# Patient Record
Sex: MALE | Race: WHITE | URBAN - METROPOLITAN AREA
[De-identification: names, ages, dates, MRNs, and addresses within clinical notes are randomized per-mention and may not be internally consistent; named-entity substitution may affect disease eponyms.]

---

## 2021-08-29 ENCOUNTER — APPOINTMENT (OUTPATIENT)
Dept: GENERAL RADIOLOGY | Age: 66
DRG: 177 | End: 2021-08-29
Payer: MEDICARE

## 2021-08-29 ENCOUNTER — HOSPITAL ENCOUNTER (INPATIENT)
Age: 66
LOS: 3 days | Discharge: LEFT BEFORE DISCHARGE | DRG: 177 | End: 2021-09-01
Attending: EMERGENCY MEDICINE | Admitting: INTERNAL MEDICINE
Payer: MEDICARE

## 2021-08-29 DIAGNOSIS — R09.02 HYPOXIA: Primary | ICD-10-CM

## 2021-08-29 DIAGNOSIS — J12.9 VIRAL PNEUMONIA: ICD-10-CM

## 2021-08-29 PROBLEM — J12.82 PNEUMONIA DUE TO 2019 NOVEL CORONAVIRUS: Status: ACTIVE | Noted: 2021-08-29

## 2021-08-29 PROBLEM — U07.1 PNEUMONIA DUE TO 2019 NOVEL CORONAVIRUS: Status: ACTIVE | Noted: 2021-08-29

## 2021-08-29 LAB
ALBUMIN SERPL-MCNC: 3.8 G/DL (ref 3.4–5)
ALP BLD-CCNC: 61 U/L (ref 40–129)
ALT SERPL-CCNC: 23 U/L (ref 10–40)
ANION GAP SERPL CALCULATED.3IONS-SCNC: 12 MMOL/L (ref 3–16)
AST SERPL-CCNC: 45 U/L (ref 15–37)
BASOPHILS ABSOLUTE: 0 K/UL (ref 0–0.2)
BASOPHILS RELATIVE PERCENT: 0.4 %
BILIRUB SERPL-MCNC: 0.7 MG/DL (ref 0–1)
BILIRUBIN DIRECT: 0.3 MG/DL (ref 0–0.3)
BILIRUBIN, INDIRECT: 0.4 MG/DL (ref 0–1)
BUN BLDV-MCNC: 16 MG/DL (ref 7–20)
CALCIUM SERPL-MCNC: 8.6 MG/DL (ref 8.3–10.6)
CHLORIDE BLD-SCNC: 100 MMOL/L (ref 99–110)
CO2: 25 MMOL/L (ref 21–32)
CREAT SERPL-MCNC: 1.5 MG/DL (ref 0.8–1.3)
EOSINOPHILS ABSOLUTE: 0 K/UL (ref 0–0.6)
EOSINOPHILS RELATIVE PERCENT: 0 %
GFR AFRICAN AMERICAN: 57
GFR NON-AFRICAN AMERICAN: 47
GLUCOSE BLD-MCNC: 117 MG/DL (ref 70–99)
HCT VFR BLD CALC: 40.6 % (ref 40.5–52.5)
HEMOGLOBIN: 14.3 G/DL (ref 13.5–17.5)
LIPASE: 62 U/L (ref 13–60)
LYMPHOCYTES ABSOLUTE: 1.1 K/UL (ref 1–5.1)
LYMPHOCYTES RELATIVE PERCENT: 29.5 %
MCH RBC QN AUTO: 31.3 PG (ref 26–34)
MCHC RBC AUTO-ENTMCNC: 35.3 G/DL (ref 31–36)
MCV RBC AUTO: 88.7 FL (ref 80–100)
MONOCYTES ABSOLUTE: 0.3 K/UL (ref 0–1.3)
MONOCYTES RELATIVE PERCENT: 8.9 %
NEUTROPHILS ABSOLUTE: 2.4 K/UL (ref 1.7–7.7)
NEUTROPHILS RELATIVE PERCENT: 61.2 %
PDW BLD-RTO: 13.5 % (ref 12.4–15.4)
PLATELET # BLD: 100 K/UL (ref 135–450)
PMV BLD AUTO: 10 FL (ref 5–10.5)
POTASSIUM REFLEX MAGNESIUM: 4.2 MMOL/L (ref 3.5–5.1)
PROCALCITONIN: 0.1 NG/ML (ref 0–0.15)
RBC # BLD: 4.58 M/UL (ref 4.2–5.9)
SARS-COV-2, NAAT: DETECTED
SODIUM BLD-SCNC: 137 MMOL/L (ref 136–145)
TOTAL PROTEIN: 7.4 G/DL (ref 6.4–8.2)
WBC # BLD: 3.9 K/UL (ref 4–11)

## 2021-08-29 PROCEDURE — U0005 INFEC AGEN DETEC AMPLI PROBE: HCPCS

## 2021-08-29 PROCEDURE — 99285 EMERGENCY DEPT VISIT HI MDM: CPT

## 2021-08-29 PROCEDURE — 80048 BASIC METABOLIC PNL TOTAL CA: CPT

## 2021-08-29 PROCEDURE — 87635 SARS-COV-2 COVID-19 AMP PRB: CPT

## 2021-08-29 PROCEDURE — 84145 PROCALCITONIN (PCT): CPT

## 2021-08-29 PROCEDURE — 85025 COMPLETE CBC W/AUTO DIFF WBC: CPT

## 2021-08-29 PROCEDURE — U0003 INFECTIOUS AGENT DETECTION BY NUCLEIC ACID (DNA OR RNA); SEVERE ACUTE RESPIRATORY SYNDROME CORONAVIRUS 2 (SARS-COV-2) (CORONAVIRUS DISEASE [COVID-19]), AMPLIFIED PROBE TECHNIQUE, MAKING USE OF HIGH THROUGHPUT TECHNOLOGIES AS DESCRIBED BY CMS-2020-01-R: HCPCS

## 2021-08-29 PROCEDURE — 6370000000 HC RX 637 (ALT 250 FOR IP): Performed by: STUDENT IN AN ORGANIZED HEALTH CARE EDUCATION/TRAINING PROGRAM

## 2021-08-29 PROCEDURE — 1200000000 HC SEMI PRIVATE

## 2021-08-29 PROCEDURE — 80076 HEPATIC FUNCTION PANEL: CPT

## 2021-08-29 PROCEDURE — 36415 COLL VENOUS BLD VENIPUNCTURE: CPT

## 2021-08-29 PROCEDURE — 71045 X-RAY EXAM CHEST 1 VIEW: CPT

## 2021-08-29 PROCEDURE — 93005 ELECTROCARDIOGRAM TRACING: CPT | Performed by: STUDENT IN AN ORGANIZED HEALTH CARE EDUCATION/TRAINING PROGRAM

## 2021-08-29 PROCEDURE — 83690 ASSAY OF LIPASE: CPT

## 2021-08-29 RX ADMIN — ALUMINUM HYDROXIDE, MAGNESIUM HYDROXIDE, AND SIMETHICONE: 200; 200; 20 SUSPENSION ORAL at 23:46

## 2021-08-29 ASSESSMENT — PAIN DESCRIPTION - DESCRIPTORS: DESCRIPTORS: PRESSURE

## 2021-08-29 ASSESSMENT — PAIN DESCRIPTION - LOCATION: LOCATION: ABDOMEN

## 2021-08-29 ASSESSMENT — PAIN DESCRIPTION - FREQUENCY: FREQUENCY: CONTINUOUS

## 2021-08-29 ASSESSMENT — PAIN SCALES - GENERAL: PAINLEVEL_OUTOF10: 6

## 2021-08-30 PROBLEM — J96.01 ACUTE RESPIRATORY FAILURE WITH HYPOXIA (HCC): Status: ACTIVE | Noted: 2021-08-30

## 2021-08-30 PROBLEM — R53.1 GENERALIZED WEAKNESS: Status: ACTIVE | Noted: 2021-08-30

## 2021-08-30 PROBLEM — R53.83 FATIGUE: Status: ACTIVE | Noted: 2021-08-30

## 2021-08-30 PROBLEM — R63.0 POOR APPETITE: Status: ACTIVE | Noted: 2021-08-30

## 2021-08-30 PROBLEM — R10.13 EPIGASTRIC ABDOMINAL PAIN: Status: ACTIVE | Noted: 2021-08-30

## 2021-08-30 LAB
A/G RATIO: 1 (ref 1.1–2.2)
ALBUMIN SERPL-MCNC: 3.4 G/DL (ref 3.4–5)
ALP BLD-CCNC: 59 U/L (ref 40–129)
ALT SERPL-CCNC: 21 U/L (ref 10–40)
ANION GAP SERPL CALCULATED.3IONS-SCNC: 11 MMOL/L (ref 3–16)
AST SERPL-CCNC: 41 U/L (ref 15–37)
BASOPHILS ABSOLUTE: 0 K/UL (ref 0–0.2)
BASOPHILS RELATIVE PERCENT: 0.2 %
BILIRUB SERPL-MCNC: 0.5 MG/DL (ref 0–1)
BUN BLDV-MCNC: 18 MG/DL (ref 7–20)
C-REACTIVE PROTEIN: 17.4 MG/L (ref 0–5.1)
CALCIUM SERPL-MCNC: 8.4 MG/DL (ref 8.3–10.6)
CHLORIDE BLD-SCNC: 100 MMOL/L (ref 99–110)
CO2: 27 MMOL/L (ref 21–32)
CREAT SERPL-MCNC: 1.4 MG/DL (ref 0.8–1.3)
EKG ATRIAL RATE: 76 BPM
EKG DIAGNOSIS: NORMAL
EKG P AXIS: 16 DEGREES
EKG P-R INTERVAL: 198 MS
EKG Q-T INTERVAL: 412 MS
EKG QRS DURATION: 138 MS
EKG QTC CALCULATION (BAZETT): 463 MS
EKG R AXIS: -36 DEGREES
EKG T AXIS: 23 DEGREES
EKG VENTRICULAR RATE: 76 BPM
EOSINOPHILS ABSOLUTE: 0 K/UL (ref 0–0.6)
EOSINOPHILS RELATIVE PERCENT: 0 %
GFR AFRICAN AMERICAN: >60
GFR NON-AFRICAN AMERICAN: 51
GLOBULIN: 3.5 G/DL
GLUCOSE BLD-MCNC: 121 MG/DL (ref 70–99)
HCT VFR BLD CALC: 40.4 % (ref 40.5–52.5)
HEMOGLOBIN: 13.8 G/DL (ref 13.5–17.5)
LYMPHOCYTES ABSOLUTE: 1.3 K/UL (ref 1–5.1)
LYMPHOCYTES RELATIVE PERCENT: 41 %
MCH RBC QN AUTO: 30.8 PG (ref 26–34)
MCHC RBC AUTO-ENTMCNC: 34.1 G/DL (ref 31–36)
MCV RBC AUTO: 90.5 FL (ref 80–100)
MONOCYTES ABSOLUTE: 0.3 K/UL (ref 0–1.3)
MONOCYTES RELATIVE PERCENT: 8.7 %
NEUTROPHILS ABSOLUTE: 1.6 K/UL (ref 1.7–7.7)
NEUTROPHILS RELATIVE PERCENT: 50.1 %
PDW BLD-RTO: 13.7 % (ref 12.4–15.4)
PLATELET # BLD: 88 K/UL (ref 135–450)
PLATELET SLIDE REVIEW: ABNORMAL
PMV BLD AUTO: 10 FL (ref 5–10.5)
POTASSIUM REFLEX MAGNESIUM: 4.1 MMOL/L (ref 3.5–5.1)
PROCALCITONIN: 0.09 NG/ML (ref 0–0.15)
RBC # BLD: 4.46 M/UL (ref 4.2–5.9)
RBC # BLD: NORMAL 10*6/UL
SARS-COV-2: DETECTED
SODIUM BLD-SCNC: 138 MMOL/L (ref 136–145)
TOTAL PROTEIN: 6.9 G/DL (ref 6.4–8.2)
VITAMIN D 25-HYDROXY: 33.5 NG/ML
WBC # BLD: 3.1 K/UL (ref 4–11)

## 2021-08-30 PROCEDURE — 86480 TB TEST CELL IMMUN MEASURE: CPT

## 2021-08-30 PROCEDURE — XW033H5 INTRODUCTION OF TOCILIZUMAB INTO PERIPHERAL VEIN, PERCUTANEOUS APPROACH, NEW TECHNOLOGY GROUP 5: ICD-10-PCS | Performed by: INTERNAL MEDICINE

## 2021-08-30 PROCEDURE — 36415 COLL VENOUS BLD VENIPUNCTURE: CPT

## 2021-08-30 PROCEDURE — 2500000003 HC RX 250 WO HCPCS: Performed by: INTERNAL MEDICINE

## 2021-08-30 PROCEDURE — 6370000000 HC RX 637 (ALT 250 FOR IP): Performed by: INTERNAL MEDICINE

## 2021-08-30 PROCEDURE — 86140 C-REACTIVE PROTEIN: CPT

## 2021-08-30 PROCEDURE — 84145 PROCALCITONIN (PCT): CPT

## 2021-08-30 PROCEDURE — 6360000002 HC RX W HCPCS: Performed by: INTERNAL MEDICINE

## 2021-08-30 PROCEDURE — 1200000000 HC SEMI PRIVATE

## 2021-08-30 PROCEDURE — 80053 COMPREHEN METABOLIC PANEL: CPT

## 2021-08-30 PROCEDURE — 82306 VITAMIN D 25 HYDROXY: CPT

## 2021-08-30 PROCEDURE — 87449 NOS EACH ORGANISM AG IA: CPT

## 2021-08-30 PROCEDURE — 87040 BLOOD CULTURE FOR BACTERIA: CPT

## 2021-08-30 PROCEDURE — 85025 COMPLETE CBC W/AUTO DIFF WBC: CPT

## 2021-08-30 PROCEDURE — 2580000003 HC RX 258: Performed by: INTERNAL MEDICINE

## 2021-08-30 RX ORDER — LANOLIN ALCOHOL/MO/W.PET/CERES
3 CREAM (GRAM) TOPICAL NIGHTLY
Status: DISCONTINUED | OUTPATIENT
Start: 2021-08-30 | End: 2021-09-01 | Stop reason: HOSPADM

## 2021-08-30 RX ORDER — SODIUM CHLORIDE 9 MG/ML
25 INJECTION, SOLUTION INTRAVENOUS PRN
Status: DISCONTINUED | OUTPATIENT
Start: 2021-08-30 | End: 2021-09-01 | Stop reason: HOSPADM

## 2021-08-30 RX ORDER — LANOLIN ALCOHOL/MO/W.PET/CERES
200 CREAM (GRAM) TOPICAL DAILY
Status: DISCONTINUED | OUTPATIENT
Start: 2021-08-30 | End: 2021-08-30

## 2021-08-30 RX ORDER — FAMOTIDINE 20 MG/1
20 TABLET, FILM COATED ORAL 2 TIMES DAILY
Status: DISCONTINUED | OUTPATIENT
Start: 2021-08-30 | End: 2021-09-01 | Stop reason: HOSPADM

## 2021-08-30 RX ORDER — ZINC SULFATE 50(220)MG
50 CAPSULE ORAL 2 TIMES DAILY
Status: DISCONTINUED | OUTPATIENT
Start: 2021-08-30 | End: 2021-09-01 | Stop reason: HOSPADM

## 2021-08-30 RX ORDER — ACETAMINOPHEN 650 MG/1
650 SUPPOSITORY RECTAL EVERY 4 HOURS PRN
Status: DISCONTINUED | OUTPATIENT
Start: 2021-08-30 | End: 2021-09-01 | Stop reason: HOSPADM

## 2021-08-30 RX ORDER — ACETAMINOPHEN 325 MG/1
650 TABLET ORAL EVERY 4 HOURS PRN
Status: DISCONTINUED | OUTPATIENT
Start: 2021-08-30 | End: 2021-09-01 | Stop reason: HOSPADM

## 2021-08-30 RX ORDER — 0.9 % SODIUM CHLORIDE 0.9 %
30 INTRAVENOUS SOLUTION INTRAVENOUS PRN
Status: DISCONTINUED | OUTPATIENT
Start: 2021-08-30 | End: 2021-09-01 | Stop reason: HOSPADM

## 2021-08-30 RX ORDER — ALBUTEROL SULFATE 90 UG/1
2 AEROSOL, METERED RESPIRATORY (INHALATION) EVERY 6 HOURS PRN
Status: DISCONTINUED | OUTPATIENT
Start: 2021-08-30 | End: 2021-09-01 | Stop reason: HOSPADM

## 2021-08-30 RX ORDER — POLYETHYLENE GLYCOL 3350 17 G/17G
17 POWDER, FOR SOLUTION ORAL DAILY PRN
Status: DISCONTINUED | OUTPATIENT
Start: 2021-08-30 | End: 2021-09-01 | Stop reason: HOSPADM

## 2021-08-30 RX ORDER — ONDANSETRON 2 MG/ML
4 INJECTION INTRAMUSCULAR; INTRAVENOUS EVERY 4 HOURS PRN
Status: DISCONTINUED | OUTPATIENT
Start: 2021-08-30 | End: 2021-09-01 | Stop reason: HOSPADM

## 2021-08-30 RX ORDER — SODIUM CHLORIDE 0.9 % (FLUSH) 0.9 %
10 SYRINGE (ML) INJECTION EVERY 12 HOURS SCHEDULED
Status: DISCONTINUED | OUTPATIENT
Start: 2021-08-30 | End: 2021-09-01 | Stop reason: HOSPADM

## 2021-08-30 RX ORDER — SODIUM CHLORIDE 0.9 % (FLUSH) 0.9 %
10 SYRINGE (ML) INJECTION PRN
Status: DISCONTINUED | OUTPATIENT
Start: 2021-08-30 | End: 2021-09-01 | Stop reason: HOSPADM

## 2021-08-30 RX ORDER — GAUZE BANDAGE 2" X 2"
200 BANDAGE TOPICAL DAILY
Status: DISCONTINUED | OUTPATIENT
Start: 2021-08-30 | End: 2021-09-01 | Stop reason: HOSPADM

## 2021-08-30 RX ORDER — BENZONATATE 100 MG/1
200 CAPSULE ORAL 3 TIMES DAILY PRN
Status: DISCONTINUED | OUTPATIENT
Start: 2021-08-30 | End: 2021-09-01 | Stop reason: HOSPADM

## 2021-08-30 RX ORDER — PANTOPRAZOLE SODIUM 40 MG/1
40 TABLET, DELAYED RELEASE ORAL
Status: DISCONTINUED | OUTPATIENT
Start: 2021-08-30 | End: 2021-09-01 | Stop reason: HOSPADM

## 2021-08-30 RX ORDER — DEXAMETHASONE 4 MG/1
6 TABLET ORAL DAILY
Status: DISCONTINUED | OUTPATIENT
Start: 2021-08-30 | End: 2021-09-01 | Stop reason: HOSPADM

## 2021-08-30 RX ADMIN — ZINC SULFATE 220 MG (50 MG) CAPSULE 50 MG: CAPSULE at 08:35

## 2021-08-30 RX ADMIN — DEXAMETHASONE 6 MG: 4 TABLET ORAL at 08:35

## 2021-08-30 RX ADMIN — SODIUM CHLORIDE 30 ML: 9 INJECTION, SOLUTION INTRAVENOUS at 09:58

## 2021-08-30 RX ADMIN — ENOXAPARIN SODIUM 40 MG: 40 INJECTION SUBCUTANEOUS at 21:25

## 2021-08-30 RX ADMIN — FAMOTIDINE 20 MG: 20 TABLET ORAL at 08:36

## 2021-08-30 RX ADMIN — Medication 10 ML: at 21:26

## 2021-08-30 RX ADMIN — Medication 10 ML: at 08:36

## 2021-08-30 RX ADMIN — Medication 200 MG: at 08:35

## 2021-08-30 RX ADMIN — ZINC SULFATE 220 MG (50 MG) CAPSULE 50 MG: CAPSULE at 21:25

## 2021-08-30 RX ADMIN — REMDESIVIR 200 MG: 100 INJECTION, POWDER, LYOPHILIZED, FOR SOLUTION INTRAVENOUS at 09:59

## 2021-08-30 RX ADMIN — ENOXAPARIN SODIUM 40 MG: 40 INJECTION SUBCUTANEOUS at 11:24

## 2021-08-30 RX ADMIN — FAMOTIDINE 20 MG: 20 TABLET ORAL at 21:25

## 2021-08-30 RX ADMIN — Medication 3 MG: at 21:25

## 2021-08-30 ASSESSMENT — PAIN SCALES - GENERAL
PAINLEVEL_OUTOF10: 0

## 2021-08-30 NOTE — ACP (ADVANCE CARE PLANNING)
acp conversation with patient by phone.     Patient has no living relatives    He declined to complete HCPOA paperwork bc he is not sure who he would name as HCPOA

## 2021-08-30 NOTE — PROGRESS NOTES
Hospitalist Progress Note      PCP: Katie Vazquez    Date of Admission: 8/29/2021    Chief Complaint: increased weakness, fatigue, poor appetite and epigastric abdominal pain    Hospital Course: Admitted for CV19. Tested positive here on 08/29/2021    Subjective:   Overall feeling ok. Not feeling particularly short of breath. Has some dry cough. Medications:  Reviewed    Infusion Medications    sodium chloride       Scheduled Medications    sodium chloride flush  10 mL IntraVENous 2 times per day    enoxaparin  40 mg Subcutaneous BID    dexamethasone  6 mg Oral Daily    famotidine  20 mg Oral BID    melatonin  3 mg Oral Nightly    zinc sulfate  50 mg Oral BID    thiamine mononitrate  200 mg Oral Daily    tocilizumab (ACTEMRA) IVPB  800 mg IntraVENous Once    remdesivir IVPB  200 mg IntraVENous Once    Followed by   Da Link ON 8/31/2021] remdesivir IVPB  100 mg IntraVENous Q24H    pantoprazole  40 mg Oral QAM AC     PRN Meds: sodium chloride flush, sodium chloride, ondansetron, polyethylene glycol, acetaminophen **OR** acetaminophen, albuterol sulfate HFA, benzonatate, sodium chloride      Intake/Output Summary (Last 24 hours) at 8/30/2021 0811  Last data filed at 8/30/2021 0657  Gross per 24 hour   Intake 240 ml   Output 0 ml   Net 240 ml       Exam:    /78   Pulse 70   Temp 99.7 °F (37.6 °C) (Oral)   Resp 18   Ht 6' (1.829 m)   Wt 255 lb (115.7 kg)   SpO2 94%   BMI 34.58 kg/m²     General appearance: No apparent distress, appears stated age and cooperative. HEENT: Pupils equal, round, and reactive to light. Conjunctivae/corneas clear. Neck: Supple, with full range of motion. No jugular venous distention. Trachea midline. Respiratory:  Normal respiratory effort. Clear to auscultation, bilaterally without Rales/Wheezes/Rhonchi. Cardiovascular: Regular rate and rhythm with normal S1/S2 without murmurs, rubs or gallops.   Abdomen: Soft, non-tender, non-distended with normal bowel sounds. Musculoskelatal: No clubbing, cyanosis or edema bilaterally. Skin: Skin color, texture, turgor normal.  No rashes or lesions. Neurologic:  Cranial nerves: II-XII intact, grossly non-focal.  Psychiatric: Alert and oriented, thought content appropriate, normal insight    Labs:   Recent Labs     08/29/21 2144 08/30/21 0441   WBC 3.9* 3.1*   HGB 14.3 13.8   HCT 40.6 40.4*   * 88*     Recent Labs     08/29/21 2144 08/30/21 0441    138   K 4.2 4.1    100   CO2 25 27   BUN 16 18   CREATININE 1.5* 1.4*   CALCIUM 8.6 8.4     Recent Labs     08/29/21 2144 08/30/21 0441   AST 45* 41*   ALT 23 21   BILIDIR 0.3  --    BILITOT 0.7 0.5   ALKPHOS 61 59     No results for input(s): INR in the last 72 hours. No results for input(s): Brock Snowball in the last 72 hours. Studies:  XR CHEST PORTABLE   Final Result      Bilateral airspace disease, peripherally distributed potentially secondary to viral pneumonia with supporting clinical history.           Assessment/Plan:    Active Hospital Problems    Diagnosis Date Noted    Acute respiratory failure with hypoxia (HCC) [J96.01] 08/30/2021    Epigastric abdominal pain [R10.13] 08/30/2021    Generalized weakness [R53.1] 08/30/2021    Fatigue [R53.83] 08/30/2021    Poor appetite [R63.0] 08/30/2021    Pneumonia due to 2019 novel coronavirus [U07.1, J12.82] 08/29/2021     Pneumonia due to COVID-19 virus  - Pt became symptomatic 6-7 days ago and tested positive here on 08/29/2021  - Pt placed in \"droplet plus\" isolation, and PPE instructions have been provided  - Blood cultures x 2 ordered  - Respiratory culture ordered  - Legionella pneumophilia and Strep pneumoniae urine antigens ordered  - Procalcitonin 0.09  - Dexamethasone started as patient is hypoxic  - Remdesivir ordered; liver function tests will be monitored daily (Remdesivir will not be continued if discharged prior to completing the full course)  - Low intensity Enoxaparin has been started  - 25 hydroxy Vitamin D wnl  - Zinc sulfate 50 mg bid ordered  - Thiamine 200 mg po daily ordered  - Pepcid 20 mg po bid  - monitor CRP (17.4)/possible need for Toci     Acute respiratory failure/Hypoxia/Shortness of Breath - as evidenced by an O2 saturation of less than 90% on room air  - Due to pneumonia caused by the COVID-19 virus. We will provide supplemental oxygen as necessary to keep SaO2 92% or greater  - Dexamethasone continued      Liver function tests  Transaminitis present  - Will monitor liver function tests daily     Poor appetite - Patient encouraged to eat a balanced diet, including protein-rich foods. Dietary has been consulted to assess patients nutrition status and make recommendations.      Generalized weakness/fatigue - Expect to improve with treatment of COVID19. If not will consider PT/OT     Epigastric abdominal pain - fairly mild at this time. PPI and monitor      DVT Prophylaxis: Low intensity Enoxaparin  Diet: ADULT DIET;  Regular  Code Status: Full Code    PT/OT Eval Status:     Dispo - Inpatient    Temi Benson DO

## 2021-08-30 NOTE — CARE COORDINATION
the prescription on their way out of the hospital at discharge, or pharmacy can deliver to the bedside if staff is available. (payment due at time of pick-up or delivery - cash, check, or card accepted)     Able to afford home medications/ co-pay costs: Yes    ADLS  Support Systems: Family Members, Parent, Friends/Neighbors    PT AM-PAC:   /24  OT AM-PAC:   /24    [place]: home alone  Steps: a few    Plans to RETURN to current housing: Yes  Barriers to RETURNING to current housing: none    Anya Rivers 77  Currently ACTIVE with [address]: Yes  [address]: Not Applicable          Durable Medical Equipment  DME Provider: n/a  Equipment: n/a    Home Oxygen and [address] prior to admission: No  [address]: Not Applicable      Dialysis  Active with HD/PD prior to admission: No  Nephrologist:     HD Center:  Not Applicable    DISCHARGE PLAN:  Disposition: Home- No Services Needed    Transportation PLAN for discharge: friend     Factors facilitating achievement of predicted outcomes: Family support    Barriers to discharge: Medical complications    Additional Case Management Notes:   Patient is from home alone, has a significant other that stays with him at times. Patient denies any CM needs at this time. A friend will transport home. The Plan for Transition of Care is related to the following treatment goals of Viral pneumonia [J12.9]  Hypoxia [R09.02]  Pneumonia due to 2019 novel coronavirus [U07.1, J12.82]    The Patient and/or patient representative Olena Esparza and his family were provided with a choice of provider and agrees with the discharge plan Yes    Freedom of choice list was provided with basic dialogue that supports the patient's individualized plan of care/goals and shares the quality data associated with the providers.  Yes    Care Transition patient: No    Monica Najera RN  [facility]  Case Management Department  Ph: [phone]   Fax: [phone]

## 2021-08-30 NOTE — FLOWSHEET NOTE
21 1209   Encounter Summary   Services provided to: Patient   Continue Visiting   (es  acp conversation)   Complexity of Encounter High   Length of Encounter 15 minutes   Advance Care Planning Yes   Routine   Type Initial   Assessment Approachable   Intervention Active listening;Explored coping resources   Outcome Engaged in conversation;Receptive   acp conversation by phone. Patient has no living relatives and no one that he would like to select as a health care decision maker. His mother had been listed as a contact, but she is .

## 2021-08-30 NOTE — FLOWSHEET NOTE
08/30/21 0052   Assessment   Charting Type Admission   Neurological   Neuro (WDL) WDL   Level of Consciousness Alert (0)   [place] Coma Scale   Eye Opening 4   Best Verbal Response 5   Best Motor Response 6   [place] Coma Scale Score 15   HEENT   HEENT (WDL) WDL   Respiratory   Respiratory (WDL) X  (3L NC, COVID +)   Respiratory Pattern Regular   Respiratory Depth Normal   Respiratory Quality/Effort Unlabored;Dyspnea with exertion   Chest Assessment Chest expansion symmetrical;Trachea midline   L Breath Sounds Diminished   R Breath Sounds Diminished   Cardiac   Cardiac (WDL) X   Cardiac Regularity Regular   Heart Sounds S1, S2   Cardiac Rhythm RBBB   Cardiac Monitor   Telemetry Monitor On Yes   Telemetry Audible Yes   Telemetry Alarms Set Yes   Gastrointestinal   Abdominal (WDL) X   GI Symptoms Cramping; Loss of appetite   Abdomen Inspection Rotund   Last BM (including prior to admit) 08/29/21   RUQ Bowel Sounds Active   LUQ Bowel Sounds Active   RLQ Bowel Sounds Active   LLQ Bowel Sounds Active   Peripheral Vascular   Peripheral Vascular (WDL) WDL   Edema Generalized;Right lower extremity; Left lower extremity   Edema Generalized +1   RLE Edema Trace   LLE Edema Trace   Skin Color/Condition   Skin Color/Condition (WDL) X   Skin Color Bronze   Skin Condition/Temp Dry; Warm   Skin Integrity   Skin Integrity (WDL) WDL   Musculoskeletal   Musculoskeletal (WDL) WDL   Genitourinary   Genitourinary (WDL) WDL   Anus/Rectum   Anus/Rectum (WDL) WDL   Psychosocial   Psychosocial (WDL) WDL        08/30/21 0052   Assessment   Charting Type Admission   Neurological   Neuro (WDL) WDL   Level of Consciousness Alert (0)   [place] Coma Scale   Eye Opening 4   Best Verbal Response 5   Best Motor Response 6   [place] Coma Scale Score 15   HEENT   HEENT (WDL) WDL   Respiratory   Respiratory (WDL) X  (3L NC, COVID +)   Respiratory Pattern Regular   Respiratory Depth Normal   Respiratory Quality/Effort Unlabored;Dyspnea with exertion Chest Assessment Chest expansion symmetrical;Trachea midline   L Breath Sounds Diminished   R Breath Sounds Diminished   Cardiac   Cardiac (WDL) X   Cardiac Regularity Regular   Heart Sounds S1, S2   Cardiac Rhythm RBBB   Cardiac Monitor   Telemetry Monitor On Yes   Telemetry Audible Yes   Telemetry Alarms Set Yes   Gastrointestinal   Abdominal (WDL) X   GI Symptoms Cramping; Loss of appetite   Abdomen Inspection Rotund   Last BM (including prior to admit) 08/29/21   RUQ Bowel Sounds Active   LUQ Bowel Sounds Active   RLQ Bowel Sounds Active   LLQ Bowel Sounds Active   Peripheral Vascular   Peripheral Vascular (WDL) WDL   Edema Generalized;Right lower extremity; Left lower extremity   Edema Generalized +1   RLE Edema Trace   LLE Edema Trace   Skin Color/Condition   Skin Color/Condition (WDL) X   Skin Color Bronze   Skin Condition/Temp Dry; Warm   Skin Integrity   Skin Integrity (WDL) WDL   Musculoskeletal   Musculoskeletal (WDL) WDL   Genitourinary   Genitourinary (WDL) WDL   Anus/Rectum   Anus/Rectum (WDL) WDL   Psychosocial   Psychosocial (WDL) WDL

## 2021-08-30 NOTE — PROGRESS NOTES
4 Eyes Admission Assessment     I agree as the admission nurse that 2 RN's have performed a thorough Head to Toe Skin Assessment on the patient. ALL assessment sites listed below have been assessed on admission. Areas assessed by both nurses: ***  []   Head, Face, and Ears   []   Shoulders, Back, and Chest  []   Arms, Elbows, and Hands   []   Coccyx, Sacrum, and Ischium  []   Legs, Feet, and Heels        Does the Patient have Skin Breakdown?   NO         Aaron Prevention initiated:  NO   Wound Care Orders initiated:  NO      [facility] nurse consulted for Pressure Injury (Stage 3,4, Unstageable, DTI, NWPT, and Complex wounds) or Aaron score 18 or lower:  NO      Nurse 1 eSignature: {Esignature:[id number]}    **SHARE this note so that the co-signing nurse is able to place an eSignature**    Nurse 2 eSignature: Electronically signed by Pk Andrade RN on 8/30/2021 at 2:36 AM

## 2021-08-30 NOTE — RT PROTOCOL NOTE
RT Nebulizer Bronchodilator Protocol Note    There is a bronchodilator order in the chart from a provider indicating to follow the RT Bronchodilator Protocol and there is an Initiate RT Bronchodilator Protocol order as well (see protocol at bottom of note). The findings from the last RT Protocol Assessment were as follows:  Smoking: Non smoker  Surgical Status: No surgery  Xray: Multiple lobe infiltrates/atelectasis/pleural effusion/edema  Respiratory Pattern: Dyspnea with exertion  Mental Status: Alert and Oriented  Breath Sounds: Diminished and/or crackles  Cough: Strong, spontaneous, non-productive  Activity Level: Mostly sedentary, minimal walking  Oxygen Requirement: Room Air - 2LNC/28% or home setting  Indication for Bronchodilator Therapy:    Bronchodilator Assessment Score: 6    Aerosolized bronchodilator medication orders have been revised according to the RT Bronchodilator Protocol. RT Bronchodilator Protocol:    Respiratory Therapist to perform RT Therapy Protocol Assessment then follow the protocol. No Indications  adjust the frequency to every 6 hours PRN wheezing or bronchospasm, if no treatments needed after 48 hours then discontinue using Per Protocol order mode. If indication present, adjust the RT bronchodilator orders based on the Bronchodilator Assessment Score as follows:    0-6  enter or revise RT Bronchodilator order to Albuterol Nebulizer order with frequency of every 2 hours PRN for wheezing or increased work of breathing using Per Protocol order mode. Repeat RT Therapy Protocol Assessment as needed. 7-10 - discontinue any other Inpatient aerosolized bronchodilator medication orders and enter or revise two Albuterol Nebulizer orders, one with BID frequency and one with frequency of every 2 hours PRN wheezing or increased work of breathing using Per Protocol order mode. Repeat RT Therapy Protocol Assessment with second treatment then BID and as needed.       11-13 - discontinue any other Inpatient aerosolized bronchodilator medication orders and enter DuoNeb Nebulizer order with QID frequency and an Albuterol Nebulizer order with frequency of every 2 hours PRN wheezing or increased work of breathing using Per Protocol order mode. Repeat RT Therapy Protocol Assessment with second treatment then QID and as needed. Greater than 13 - discontinue any other Inpatient aerosolized bronchodilator medication orders and enter a DuoNeb Nebulizer order with every 4 hours frequency and an Albuterol Nebulizer order with frequency of every 2 hours PRN wheezing or increased work of breathing using Per Protocol order mode. Repeat RT Therapy Protocol Assessment with second treatment then every 4 hours and as needed. RT to enter RT Home Evaluation for COPD & MDI Assessment order using Per Protocol order mode.     Electronically signed by Russell Roe RCP on 8/30/2021 at 4:29 AM

## 2021-08-30 NOTE — PLAN OF CARE
Problem: Gas Exchange - Impaired  Goal: Absence of hypoxia  Outcome: Ongoing     Problem: Breathing Pattern - Ineffective  Goal: Ability to achieve and maintain a regular respiratory rate  Outcome: Ongoing     Problem:  Body Temperature -  Risk of, Imbalanced  Goal: Ability to maintain a body temperature within defined limits  Outcome: Ongoing     Problem: Isolation Precautions - Risk of Spread of Infection  Goal: Prevent transmission of infection  Outcome: Ongoing     Problem: Risk for Fluid Volume Deficit  Goal: Maintain normal heart rhythm  Outcome: Ongoing     Problem: Fatigue  Goal: Verbalize increase energy and improved vitality  Outcome: Ongoing     Problem: Patient Education: Go to Patient Education Activity  Goal: Patient/Family Education  Outcome: Ongoing

## 2021-08-30 NOTE — ED PROVIDER NOTES
[id number] RESIDENT NOTE       Date of evaluation: 8/29/2021    Chief Complaint     Abdominal Pain      of Present Illness     Trell Madrid is a 76 y.o. male who presents with 1 week history of epigastric abdominal pain. Patient states that approximately 1 week ago he started developing epigastric abdominal pain, feels like a knot in his belly, a mild-moderate intensity has been constant over the past week. Patient states that he has tried Tums, ibuprofen and additional antiacids without relief of his symptoms. He also has noticed some generalized malaise increased fatigue and decreased appetite over the past week. He reports his last bowel movement was this morning, normal for him without hematochezia or melena. He does not know anything that makes it better or worse. He denies any fevers, chills, headache, changes in vision, numbness/tingling, weakness, cough, shortness of breath, chest pain/palpitations, nausea/vomiting/diarrhea, lower extremity edema and rash. Review of Systems     Denies any fevers, chills, headache, changes in vision, numbness/tingling, weakness, cough, shortness of breath, chest pain/palpitations, nausea/vomiting/diarrhea, lower extremity edema and rash. All other systems were reviewed and were negative. Past Medical, Surgical, Family, and Social History     He has no past medical history on file. He has a past surgical history that includes hip surgery (Bilateral, 1507) and Umbilical hernia repair (N/A, 11/10/2014). His family history is not on file. He reports that he has never smoked. He has never used smokeless tobacco. He reports that he does not drink alcohol and does not use drugs. Medications     Previous Medications    No medications on file       Allergies     He has No Known Allergies.     Physical Exam     INITIAL VITALS: BP: 116/67, Temp: 99.9 °F (37.7 °C), Pulse: 75, Resp: 20, SpO2: (!) 87 %     General: Well appearing. No acute distress  Eyes:  PERRL. EOMI. No discharge from eyes   ENT:  No discharge from nose. OP clear  Neck:  Supple, trachea midline  Pulmonary:   Non-labored breathing. Breath sounds clear bilaterally  Cardiac:  Regular rate and rhythm. No murmurs/rubs/gallops  Abdomen:   Soft, slight tenderness to palpation in the epigastric region, no rebound tenderness guarding or masses. Nondistended. Musculoskeletal:  No long bone deformity. Vascular:  Extremities warm and perfused. Normal pulses in all 4 extremities  Skin:  Dry, no rashes  Extremities:  No peripheral edema  Neuro:  Alert. Moves all four extremities to command. No focal deficit        DiagnosticResults     EKG   Interpreted in conjunction with emergencydepartment physician No att. providers found  Rhythm: normal sinus   Rate: normal  Axis: left  Ectopy: none  Conduction: right bundle branch block (complete)  ST Segments: normal  T Waves:normal  Q Waves: none  Clinical Impression: Normal sinus rhythm with right bundle branch block, no prior EKG available for comparison. RADIOLOGY:  XR CHEST PORTABLE   Final Result      Bilateral airspace disease, peripherally distributed potentially secondary to viral pneumonia with supporting clinical history.           LABS:   Results for orders placed or performed during the hospital encounter of 08/29/21   COVID-19, Rapid    Specimen: Nasopharyngeal Swab   Result Value Ref Range    SARS-CoV-2, NAAT DETECTED (AA) Not Detected   CBC Auto Differential   Result Value Ref Range    WBC 3.9 (L) 4.0 - 11.0 K/uL    RBC 4.58 4.20 - 5.90 M/uL    Hemoglobin 14.3 13.5 - 17.5 g/dL    Hematocrit 40.6 40.5 - 52.5 %    MCV 88.7 80.0 - 100.0 fL    MCH 31.3 26.0 - 34.0 pg    MCHC 35.3 31.0 - 36.0 g/dL    RDW 13.5 12.4 - 15.4 %    Platelets 370 (L) 779 - 450 K/uL    MPV 10.0 5.0 - 10.5 fL    Neutrophils % 61.2 %    Lymphocytes % 29.5 %    Monocytes % 8.9 %    Eosinophils % 0.0 %    Basophils % 0.4 %    Neutrophils Absolute 2.4 1.7 - 7.7 K/uL    Lymphocytes Absolute 1.1 1.0 - 5.1 K/uL    Monocytes Absolute 0.3 0.0 - 1.3 K/uL    Eosinophils Absolute 0.0 0.0 - 0.6 K/uL    Basophils Absolute 0.0 0.0 - 0.2 K/uL   Basic Metabolic Panel w/ Reflex to MG   Result Value Ref Range    Sodium 137 136 - 145 mmol/L    Potassium reflex Magnesium 4.2 3.5 - 5.1 mmol/L    Chloride 100 99 - 110 mmol/L    CO2 25 21 - 32 mmol/L    Anion Gap 12 3 - 16    Glucose 117 (H) 70 - 99 mg/dL    BUN 16 7 - 20 mg/dL    CREATININE 1.5 (H) 0.8 - 1.3 mg/dL    GFR Non- 47 (A) >60    GFR  57 (A) >60    Calcium 8.6 8.3 - 10.6 mg/dL   Lipase   Result Value Ref Range    Lipase 62.0 (H) 13.0 - 60.0 U/L   Hepatic Function Panel   Result Value Ref Range    Total Protein 7.4 6.4 - 8.2 g/dL    Albumin 3.8 3.4 - 5.0 g/dL    Alkaline Phosphatase 61 40 - 129 U/L    ALT 23 10 - 40 U/L    AST 45 (H) 15 - 37 U/L    Total Bilirubin 0.7 0.0 - 1.0 mg/dL    Bilirubin, Direct 0.3 0.0 - 0.3 mg/dL    Bilirubin, Indirect 0.4 0.0 - 1.0 mg/dL   Procalcitonin   Result Value Ref Range    Procalcitonin 0.10 0.00 - 0.15 ng/mL         RECENT VITALS:  BP: 133/76, Temp: 99.9 °F (37.7 °C), Pulse: 73,Resp: 24, SpO2: 92 %       ED Course     Nursing Notes, Past Medical Hx, Past Surgical Hx, Social Hx, Allergies, and Family Hx were reviewed. The patient was given the followingmedications:  Orders Placed This Encounter   Medications    aluminum & magnesium hydroxide-simethicone (MAALOX) 30 mL, lidocaine viscous hcl (XYLOCAINE) 5 mL (GI COCKTAIL)       CONSULTS:  Nichole Ramsey / BRYAN / Kalpana Whitmoregadiel is a 76 y.o. male presents to the ED for a 1 week history of epigastric abdominal pain. On arrival, patient was noted to be hypoxic to 87% on room air, was placed on 2 L of oxygen and saturating in the low 90s. He was afebrile and the additional vital signs were unremarkable.  Given patient's hypoxia, there was some concern for Covid along with his generalized malaise, decreased appetite and increased fatigue. Lower suspicion for cholecystitis or pancreatitis causing abdominal pain given unremarkable laboratory work. Patient CBC with leukopenia, and BMP likely showing an CAROL. Chest x-ray was notable for multifocal pneumonia and patient's rapid Covid test was positive. Given positive Covid test with new oxygen requirement, patient will be admitted for further evaluation and management. Patient was discussed with internal medicine who agreed with plan for admission and further evaluation. This patient was also evaluated by the attending physician. All care plans werediscussed and agreed upon. At this time the patient has been admitted to medicine for further evaluation and management of hypoxia and covid. The patient will continue to be monitored here in the emergency department until which time he is moved to his new treatment location. Clinical Impression     1. Hypoxia    2. Viral pneumonia        Disposition     PATIENT REFERRED TO:  No follow-up provider specified.     DISCHARGE MEDICATIONS:  New Prescriptions    No medications on file       DISPOSITION Admitted 08/29/2021 11:19:09 PM      Meenakshi Johnson MD  08/30/21 6243

## 2021-08-30 NOTE — ED PROVIDER NOTES
ED Attending Attestation Note     Date of evaluation: 8/29/2021    This patient was seen by the resident. I have seen and examined the patient, agree with the workup, evaluation, management and diagnosis. The care plan has been discussed. I have reviewed the ECG and concur with the resident's interpretation. My assessment reveals an unwell appearing 57-year-old unvaccinated male patient who presents with a week of mild diffuse abdominal discomfort. He has a low-grade fever and was hypoxic on room air. Chest x-ray shows bilateral airspace disease suggestive of viral pneumonia. He has a relative leukopenia also suggestive of viral infection. Given his new oxygen requirement, unvaccinated state, and chest x-ray he will likely require admission for suspected Covid pneumonia. .     Kamilla Mayo MD  08/29/21 4374

## 2021-08-30 NOTE — CONSULTS
Clinical Pharmacy Consult Note  Remdesivir Initiation     Pharmacy asked to dose Remdesivir by Dr. Javier Ramos. Have ordered Remdesivir 200 mg IV x1, followed by 100 mg IV q24h x 4 days. This patient meets criteria for initiation of remdesivir based on the following:  · Proven COVID-19  · Moderate disease (Requiring supplemental O2)  · Acceptable hepatic function (ALT within 5 times ULN)    Exclusion Criteria:   Severe disease requiring invasive or non-invasive mechanical ventilation (includes HFNC & BiPAP)   Could consider use in patients requiring high flow if early on in the disease course (based on symptom duration)   Use of more than 1 vasopressor prior to remdesivir initiation   Already improving on supportive treatment and/or impending discharge   Patients in whom the clinical team think death is in the immediate short-term where remdesivir is unlikely to change the clinical outcome     Liver function tests will be monitored daily while on remdesivir.     Ceci BooD, BCPS  Wireless: [id number]   8/30/2021 11:07 AM

## 2021-08-31 LAB
A/G RATIO: 0.9 (ref 1.1–2.2)
ALBUMIN SERPL-MCNC: 3.4 G/DL (ref 3.4–5)
ALP BLD-CCNC: 61 U/L (ref 40–129)
ALT SERPL-CCNC: 21 U/L (ref 10–40)
ANION GAP SERPL CALCULATED.3IONS-SCNC: 10 MMOL/L (ref 3–16)
APTT: 33.2 SEC (ref 26.2–38.6)
AST SERPL-CCNC: 37 U/L (ref 15–37)
BASOPHILS ABSOLUTE: 0 K/UL (ref 0–0.2)
BASOPHILS RELATIVE PERCENT: 0.1 %
BILIRUB SERPL-MCNC: 0.4 MG/DL (ref 0–1)
BUN BLDV-MCNC: 24 MG/DL (ref 7–20)
C-REACTIVE PROTEIN: 14.7 MG/L (ref 0–5.1)
CALCIUM SERPL-MCNC: 8.6 MG/DL (ref 8.3–10.6)
CHLORIDE BLD-SCNC: 102 MMOL/L (ref 99–110)
CO2: 26 MMOL/L (ref 21–32)
CREAT SERPL-MCNC: 1.2 MG/DL (ref 0.8–1.3)
D DIMER: 220 NG/ML DDU (ref 0–229)
EOSINOPHILS ABSOLUTE: 0 K/UL (ref 0–0.6)
EOSINOPHILS RELATIVE PERCENT: 0 %
FERRITIN: 1910 NG/ML (ref 30–400)
FIBRINOGEN: 391 MG/DL (ref 200–397)
GFR AFRICAN AMERICAN: >60
GFR NON-AFRICAN AMERICAN: >60
GLOBULIN: 3.6 G/DL
GLUCOSE BLD-MCNC: 190 MG/DL (ref 70–99)
HCT VFR BLD CALC: 38.6 % (ref 40.5–52.5)
HEMOGLOBIN: 13.5 G/DL (ref 13.5–17.5)
INR BLD: 1.03 (ref 0.88–1.12)
L. PNEUMOPHILA SEROGP 1 UR AG: NORMAL
LACTATE DEHYDROGENASE: 401 U/L (ref 100–190)
LACTIC ACID: 2.6 MMOL/L (ref 0.4–2)
LYMPHOCYTES ABSOLUTE: 1.2 K/UL (ref 1–5.1)
LYMPHOCYTES RELATIVE PERCENT: 31.3 %
MCH RBC QN AUTO: 31.3 PG (ref 26–34)
MCHC RBC AUTO-ENTMCNC: 35 G/DL (ref 31–36)
MCV RBC AUTO: 89.6 FL (ref 80–100)
MONOCYTES ABSOLUTE: 0.3 K/UL (ref 0–1.3)
MONOCYTES RELATIVE PERCENT: 9 %
NEUTROPHILS ABSOLUTE: 2.2 K/UL (ref 1.7–7.7)
NEUTROPHILS RELATIVE PERCENT: 59.6 %
PDW BLD-RTO: 13.7 % (ref 12.4–15.4)
PLATELET # BLD: 99 K/UL (ref 135–450)
PLATELET SLIDE REVIEW: ABNORMAL
PMV BLD AUTO: 10.4 FL (ref 5–10.5)
POTASSIUM REFLEX MAGNESIUM: 4.9 MMOL/L (ref 3.5–5.1)
PROTHROMBIN TIME: 11.7 SEC (ref 9.9–12.7)
RBC # BLD: 4.31 M/UL (ref 4.2–5.9)
RBC # BLD: NORMAL 10*6/UL
SODIUM BLD-SCNC: 138 MMOL/L (ref 136–145)
STREP PNEUMONIAE ANTIGEN, URINE: NORMAL
TOTAL PROTEIN: 7 G/DL (ref 6.4–8.2)
TROPONIN: <0.01 NG/ML
WBC # BLD: 3.7 K/UL (ref 4–11)

## 2021-08-31 PROCEDURE — 85730 THROMBOPLASTIN TIME PARTIAL: CPT

## 2021-08-31 PROCEDURE — 85379 FIBRIN DEGRADATION QUANT: CPT

## 2021-08-31 PROCEDURE — 1200000000 HC SEMI PRIVATE

## 2021-08-31 PROCEDURE — 83615 LACTATE (LD) (LDH) ENZYME: CPT

## 2021-08-31 PROCEDURE — 85610 PROTHROMBIN TIME: CPT

## 2021-08-31 PROCEDURE — 2580000003 HC RX 258: Performed by: INTERNAL MEDICINE

## 2021-08-31 PROCEDURE — 6370000000 HC RX 637 (ALT 250 FOR IP): Performed by: INTERNAL MEDICINE

## 2021-08-31 PROCEDURE — 2500000003 HC RX 250 WO HCPCS: Performed by: INTERNAL MEDICINE

## 2021-08-31 PROCEDURE — 84484 ASSAY OF TROPONIN QUANT: CPT

## 2021-08-31 PROCEDURE — 80053 COMPREHEN METABOLIC PANEL: CPT

## 2021-08-31 PROCEDURE — XW033E5 INTRODUCTION OF REMDESIVIR ANTI-INFECTIVE INTO PERIPHERAL VEIN, PERCUTANEOUS APPROACH, NEW TECHNOLOGY GROUP 5: ICD-10-PCS | Performed by: INTERNAL MEDICINE

## 2021-08-31 PROCEDURE — 82728 ASSAY OF FERRITIN: CPT

## 2021-08-31 PROCEDURE — 85025 COMPLETE CBC W/AUTO DIFF WBC: CPT

## 2021-08-31 PROCEDURE — 94760 N-INVAS EAR/PLS OXIMETRY 1: CPT

## 2021-08-31 PROCEDURE — 86140 C-REACTIVE PROTEIN: CPT

## 2021-08-31 PROCEDURE — 85384 FIBRINOGEN ACTIVITY: CPT

## 2021-08-31 PROCEDURE — 6360000002 HC RX W HCPCS: Performed by: INTERNAL MEDICINE

## 2021-08-31 PROCEDURE — 36415 COLL VENOUS BLD VENIPUNCTURE: CPT

## 2021-08-31 PROCEDURE — 83605 ASSAY OF LACTIC ACID: CPT

## 2021-08-31 RX ORDER — FLUTICASONE PROPIONATE 50 MCG
1 SPRAY, SUSPENSION (ML) NASAL DAILY
Status: DISCONTINUED | OUTPATIENT
Start: 2021-08-31 | End: 2021-09-01 | Stop reason: HOSPADM

## 2021-08-31 RX ADMIN — Medication 200 MG: at 08:31

## 2021-08-31 RX ADMIN — Medication 10 ML: at 08:31

## 2021-08-31 RX ADMIN — REMDESIVIR 100 MG: 100 INJECTION, POWDER, LYOPHILIZED, FOR SOLUTION INTRAVENOUS at 06:22

## 2021-08-31 RX ADMIN — FLUTICASONE PROPIONATE 1 SPRAY: 50 SPRAY, METERED NASAL at 10:59

## 2021-08-31 RX ADMIN — Medication 3 MG: at 20:44

## 2021-08-31 RX ADMIN — ENOXAPARIN SODIUM 40 MG: 40 INJECTION SUBCUTANEOUS at 08:31

## 2021-08-31 RX ADMIN — DEXAMETHASONE 6 MG: 4 TABLET ORAL at 08:30

## 2021-08-31 RX ADMIN — Medication 10 ML: at 20:44

## 2021-08-31 RX ADMIN — ENOXAPARIN SODIUM 40 MG: 40 INJECTION SUBCUTANEOUS at 20:43

## 2021-08-31 RX ADMIN — FAMOTIDINE 20 MG: 20 TABLET ORAL at 08:31

## 2021-08-31 RX ADMIN — PANTOPRAZOLE SODIUM 40 MG: 40 TABLET, DELAYED RELEASE ORAL at 06:03

## 2021-08-31 RX ADMIN — FAMOTIDINE 20 MG: 20 TABLET ORAL at 20:43

## 2021-08-31 RX ADMIN — ZINC SULFATE 220 MG (50 MG) CAPSULE 50 MG: CAPSULE at 20:44

## 2021-08-31 RX ADMIN — ZINC SULFATE 220 MG (50 MG) CAPSULE 50 MG: CAPSULE at 08:30

## 2021-08-31 ASSESSMENT — PAIN SCALES - GENERAL
PAINLEVEL_OUTOF10: 0

## 2021-08-31 NOTE — PLAN OF CARE
Problem: Airway Clearance - Ineffective  Goal: Achieve or maintain patent airway  Outcome: Ongoing   Patient airway patnt, remains on 2L NC with oxygen saturation 92% and above. No acute distress noted. Problem: Body Temperature -  Risk of, Imbalanced  Goal: Ability to maintain a body temperature within defined limits  Outcome: Ongoing   Remains afebrile thus far this shift.   Problem: Isolation Precautions - Risk of Spread of Infection  Goal: Prevent transmission of infection  Outcome: Ongoing   Continues in droplet plus isolation due to covid positive

## 2021-08-31 NOTE — CARE COORDINATION
Case Management Assessment           Daily Note                 Date/ Time of Note: 8/31/2021 2:51 PM         Note completed by: Elizabeth Strong RN    Patient Name: Marcia Remy  YOB: 1955    Diagnosis:Viral pneumonia [J12.9]  Hypoxia [R09.02]  Pneumonia due to 2019 novel coronavirus [U07.1, J12.82]  Patient Admission Status: Inpatient    Date of Janes Ward  9:23 PM Length of Stay: 2 GLOS: GMLOS: 5.4    Current Plan of Care: 11L O2, remdesivir  ________________________________________________________________________________________  PT AM-PAC:   / 24 per last evaluation on:     OT AM-PAC:   / 24 per last evaluation on:     DME Needs for discharge: tbd  ________________________________________________________________________________________  Discharge Plan: Home    Tentative discharge date: TBD    Current barriers to discharge: medical clearance      ________________________________________________________________________________________  Case Management Notes:  Patient is from home alone, independent pta, pt may need home O2 at discharge. CM to follow. Nadine Neff and his family were provided with choice of provider; he and his family are in agreement with the discharge plan.     Care Transition Patient: No    Elizabeth Robe, RN  [facility]  Case Management Department  Ph: [phone]  Fax: [phone]

## 2021-08-31 NOTE — PROGRESS NOTES
Hospitalist Progress Note      PCP: Sera Lloyd    Date of Admission: 8/29/2021    Chief Complaint: increased weakness, fatigue, poor appetite and epigastric abdominal pain    Hospital Course: Admitted for CV19. Tested positive here on 08/29/2021    Subjective:   Overall feeling ok, reports he does not feel different from yesterday. No new complaints. Was on 11 L but now is satting 91-92% on 6 L. .     Medications:  Reviewed    Infusion Medications    sodium chloride       Scheduled Medications    fluticasone  1 spray Each Nostril Daily    sodium chloride flush  10 mL IntraVENous 2 times per day    enoxaparin  40 mg SubCUTAneous BID    dexamethasone  6 mg Oral Daily    famotidine  20 mg Oral BID    melatonin  3 mg Oral Nightly    zinc sulfate  50 mg Oral BID    thiamine mononitrate  200 mg Oral Daily    remdesivir IVPB  100 mg IntraVENous Q24H    pantoprazole  40 mg Oral QAM AC     PRN Meds: sodium chloride flush, sodium chloride, ondansetron, polyethylene glycol, acetaminophen **OR** acetaminophen, albuterol sulfate HFA, benzonatate, sodium chloride, benzocaine-menthol      Intake/Output Summary (Last 24 hours) at 8/31/2021 1500  Last data filed at 8/31/2021 0924  Gross per 24 hour   Intake 580 ml   Output 150 ml   Net 430 ml       Exam:    /73   Pulse 65   Temp 97.8 °F (36.6 °C) (Oral)   Resp 18   Ht 6' (1.829 m)   Wt 255 lb (115.7 kg)   SpO2 93%   BMI 34.58 kg/m²     General appearance: No apparent distress, appears stated age and cooperative. HEENT: Pupils equal, round, and reactive to light. Conjunctivae/corneas clear. Neck: Supple, with full range of motion. No jugular venous distention. Trachea midline. Respiratory:  Normal respiratory effort. Clear to auscultation, bilaterally without Rales/Wheezes/Rhonchi. Cardiovascular: Regular rate and rhythm with normal S1/S2 without murmurs, rubs or gallops.   Abdomen: Soft, non-tender, non-distended with normal bowel sounds. Musculoskelatal: No clubbing, cyanosis or edema bilaterally. Skin: Skin color, texture, turgor normal.  No rashes or lesions. Neurologic:  Cranial nerves: II-XII intact, grossly non-focal.  Psychiatric: Alert and oriented, thought content appropriate, normal insight    Labs:   Recent Labs     08/29/21 2144 08/30/21 0441 08/31/21 0442   WBC 3.9* 3.1* 3.7*   HGB 14.3 13.8 13.5   HCT 40.6 40.4* 38.6*   * 88* 99*     Recent Labs     08/29/21 2144 08/30/21 0441 08/31/21 0442    138 138   K 4.2 4.1 4.9    100 102   CO2 25 27 26   BUN 16 18 24*   CREATININE 1.5* 1.4* 1.2   CALCIUM 8.6 8.4 8.6     Recent Labs     08/29/21 2144 08/30/21 0441 08/31/21 0442   AST 45* 41* 37   ALT 23 21 21   BILIDIR 0.3  --   --    BILITOT 0.7 0.5 0.4   ALKPHOS 61 59 61     Recent Labs     08/31/21 0442   INR 1.03     Recent Labs     08/31/21 0442   TROPONINI <0.01       Studies:  XR CHEST PORTABLE   Final Result      Bilateral airspace disease, peripherally distributed potentially secondary to viral pneumonia with supporting clinical history.           Assessment/Plan:    Active Hospital Problems    Diagnosis Date Noted    Acute respiratory failure with hypoxia (HCC) [J96.01] 08/30/2021    Epigastric abdominal pain [R10.13] 08/30/2021    Generalized weakness [R53.1] 08/30/2021    Fatigue [R53.83] 08/30/2021    Poor appetite [R63.0] 08/30/2021    Pneumonia due to 2019 novel coronavirus [U07.1, J12.82] 08/29/2021     Pneumonia due to COVID-19 virus  - Pt became symptomatic 6-7 days ago and tested positive here on 08/29/2021  - Pt placed in \"droplet plus\" isolation, and PPE instructions have been provided  - Blood cultures x 2 negative  - Respiratory culture ordered but minimal cough  - Legionella pneumophilia and Strep pneumoniae urine antigens negative  - Procalcitonin 0.09  - Dexamethasone continued  - Remdesivir ordered; liver function tests will be monitored daily (Remdesivir will not be continued if discharged prior to completing the full course)  - Low intensity Enoxaparin has been started  - 25 hydroxy Vitamin D wnl  - Zinc sulfate 50 mg bid ordered  - Thiamine 200 mg po daily ordered  - Pepcid 20 mg po bid  - monitor CRP (14.7 <--17.4)/possible need for Toci  - D-dimer is low  - Wean O2 while awake, may have some sleep apnea which is complicating assessment     Acute respiratory failure/Hypoxia/Shortness of Breath - as evidenced by an O2 saturation of less than 90% on room air  - Due to pneumonia caused by the COVID-19 virus. We will provide supplemental oxygen as necessary to keep SaO2 92% or greater  - Dexamethasone continued      Liver function tests  Transaminitis present  - Will monitor liver function tests daily     Poor appetite - Patient encouraged to eat a balanced diet, including protein-rich foods. Dietary has been consulted to assess patients nutrition status and make recommendations.      Generalized weakness/fatigue - Expect to improve with treatment of COVID19. If not will consider PT/OT     Epigastric abdominal pain - fairly mild at this time. PPI and monitor      DVT Prophylaxis: Low intensity Enoxaparin  Diet: ADULT DIET;  Regular  Code Status: Full Code    PT/OT Eval Status:     Dispo - Inpatient    Temi Benson DO

## 2021-08-31 NOTE — ACP (ADVANCE CARE PLANNING)
Advance Care Planning     Advance Care Planning Inpatient Note  Spiritual Care Department    Today's Date: 8/31/2021  Unit: [place] [address] PCU    Received request from [facility]. Upon review of chart and communication with care team, patient's decision making abilities are not in question. . Patient and I spoke on the phone. Goals of ACP Conversation:  confirm decision maker and get phone number    Health Care Decision Makers:       Primary Decision Maker: Yesenia Jay Partner - [phone]    Summary:  [facility]    Advance Care Planning Documents (Patient Wishes):  None     Assessment:  Spoke with Mr. Agnes Araujo by phone due to isolation status. In previous acp conversation yesterday, pt claimed he had no one to name. However,  spoke with pt yesterday and he named his live in girlfriend Tera Santoyo. The chart did not list her phone number. I followed up today, intending to get her phone number, and shared with Mr. Agnes Araujo why we want another contact. I explained the difference between a point of contact and a \"decision maker. \" I asked Mr. Agnes Araujo if he felt comfortable allowing Tera Santoyo to make medical decisions in the event he could not speak for himself, and he replied, \"oh yes, she can make decisions. 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 He continually stressed that he is feeling better and will take it easy when he gets home. Mr. Agnes Araujo did not want to address filling out AD paperwork, as he is anxious to get home and help with pressing business needs. Mr. Agnes Araujo was pleasant to speak with, and hopeful as he is feeling better. I presented scenarios to help him understand why we are trying to update his chart with relevant information: so that we can take care of him, and make sure we are able to do what he would choose if his health declines. Interventions:  Encouraged ongoing ACP conversation with future decision makers and loved ones  Deferred conversation as patient not interested in completing an advance directive at this time  Helped pt understand why we need a ; and obtained her phone number    Care Preferences Communicated:   No    Outcomes/Plan:  Updated chart with primary decision maker name and phone number    Electronically signed by Jennifer Childs Fuzz on 8/31/2021 at 10:06 AM

## 2021-08-31 NOTE — FLOWSHEET NOTE
Spiritual Care Note  Referral for ACP conversation; see ACP note. Also able to provide empathic listening and pastoral support as pt shared how illness has impacted him at this time. 8/31/2021  Chaplain Elroy Joseph [facility]     08/31/21 8571   Encounter Summary   Services provided to: Patient   Referral/Consult From: Palliative Care   Support System Significant other   Continue Visiting   ( acp 8 31 21)   Complexity of Encounter Moderate   Length of Encounter 15 minutes   Advance Care Planning Yes   Routine   Type Follow up   Assessment Calm; Approachable; Hopeful;Coping   Intervention Active listening;Explored feelings, thoughts, concerns;[place];Sustaining presence/ Ministry of presence; Discussed illness/injury and it's impact   Outcome Engaged in conversation;Expressed feelings/needs/concerns

## 2021-08-31 NOTE — PROGRESS NOTES
O2 86% this morning on 4L O2. Patient denies SOB. Only complaint is head congestion. RT assessed patient as well. In order to get spO2 92%, patient placed on high flow 11 L. Dr. Eleanor Ordaz made aware. Flonase ordered.      Nikhil Vanessa RN

## 2021-09-01 VITALS
HEIGHT: 72 IN | RESPIRATION RATE: 20 BRPM | TEMPERATURE: 97.4 F | HEART RATE: 64 BPM | WEIGHT: 255 LBS | DIASTOLIC BLOOD PRESSURE: 95 MMHG | BODY MASS INDEX: 34.54 KG/M2 | OXYGEN SATURATION: 82 % | SYSTOLIC BLOOD PRESSURE: 147 MMHG

## 2021-09-01 LAB
A/G RATIO: 1.1 (ref 1.1–2.2)
ALBUMIN SERPL-MCNC: 3.4 G/DL (ref 3.4–5)
ALP BLD-CCNC: 60 U/L (ref 40–129)
ALT SERPL-CCNC: 24 U/L (ref 10–40)
ANION GAP SERPL CALCULATED.3IONS-SCNC: 9 MMOL/L (ref 3–16)
AST SERPL-CCNC: 32 U/L (ref 15–37)
BASOPHILS ABSOLUTE: 0 K/UL (ref 0–0.2)
BASOPHILS RELATIVE PERCENT: 0.1 %
BILIRUB SERPL-MCNC: 0.5 MG/DL (ref 0–1)
BUN BLDV-MCNC: 25 MG/DL (ref 7–20)
C-REACTIVE PROTEIN: 7.1 MG/L (ref 0–5.1)
CALCIUM SERPL-MCNC: 8.6 MG/DL (ref 8.3–10.6)
CHLORIDE BLD-SCNC: 98 MMOL/L (ref 99–110)
CO2: 27 MMOL/L (ref 21–32)
CREAT SERPL-MCNC: 1.1 MG/DL (ref 0.8–1.3)
D DIMER: <200 NG/ML DDU (ref 0–229)
EOSINOPHILS ABSOLUTE: 0 K/UL (ref 0–0.6)
EOSINOPHILS RELATIVE PERCENT: 0 %
GFR AFRICAN AMERICAN: >60
GFR NON-AFRICAN AMERICAN: >60
GLOBULIN: 3.2 G/DL
GLUCOSE BLD-MCNC: 180 MG/DL (ref 70–99)
HCT VFR BLD CALC: 40.5 % (ref 40.5–52.5)
HEMOGLOBIN: 13.9 G/DL (ref 13.5–17.5)
LYMPHOCYTES ABSOLUTE: 1.3 K/UL (ref 1–5.1)
LYMPHOCYTES RELATIVE PERCENT: 15.7 %
MCH RBC QN AUTO: 31.2 PG (ref 26–34)
MCHC RBC AUTO-ENTMCNC: 34.3 G/DL (ref 31–36)
MCV RBC AUTO: 90.9 FL (ref 80–100)
MONOCYTES ABSOLUTE: 0.6 K/UL (ref 0–1.3)
MONOCYTES RELATIVE PERCENT: 6.8 %
NEUTROPHILS ABSOLUTE: 6.4 K/UL (ref 1.7–7.7)
NEUTROPHILS RELATIVE PERCENT: 77.4 %
PDW BLD-RTO: 13.8 % (ref 12.4–15.4)
PLATELET # BLD: 110 K/UL (ref 135–450)
PLATELET SLIDE REVIEW: ADEQUATE
PMV BLD AUTO: 10.6 FL (ref 5–10.5)
POTASSIUM REFLEX MAGNESIUM: 5 MMOL/L (ref 3.5–5.1)
PROCALCITONIN: 0.06 NG/ML (ref 0–0.15)
QUANTI TB GOLD PLUS: NEGATIVE
QUANTI TB1 MINUS NIL: 0 IU/ML (ref 0–0.34)
QUANTI TB2 MINUS NIL: 0 IU/ML (ref 0–0.34)
QUANTIFERON MITOGEN: >10 IU/ML
QUANTIFERON NIL: 0.05 IU/ML
RBC # BLD: 4.45 M/UL (ref 4.2–5.9)
RBC # BLD: NORMAL 10*6/UL
SODIUM BLD-SCNC: 134 MMOL/L (ref 136–145)
TOTAL PROTEIN: 6.6 G/DL (ref 6.4–8.2)
WBC # BLD: 8.3 K/UL (ref 4–11)

## 2021-09-01 PROCEDURE — 2700000000 HC OXYGEN THERAPY PER DAY

## 2021-09-01 PROCEDURE — 6370000000 HC RX 637 (ALT 250 FOR IP): Performed by: INTERNAL MEDICINE

## 2021-09-01 PROCEDURE — 2580000003 HC RX 258: Performed by: INTERNAL MEDICINE

## 2021-09-01 PROCEDURE — 86140 C-REACTIVE PROTEIN: CPT

## 2021-09-01 PROCEDURE — 85379 FIBRIN DEGRADATION QUANT: CPT

## 2021-09-01 PROCEDURE — 36415 COLL VENOUS BLD VENIPUNCTURE: CPT

## 2021-09-01 PROCEDURE — 84145 PROCALCITONIN (PCT): CPT

## 2021-09-01 PROCEDURE — 80053 COMPREHEN METABOLIC PANEL: CPT

## 2021-09-01 PROCEDURE — 2500000003 HC RX 250 WO HCPCS: Performed by: INTERNAL MEDICINE

## 2021-09-01 PROCEDURE — 85025 COMPLETE CBC W/AUTO DIFF WBC: CPT

## 2021-09-01 PROCEDURE — 94761 N-INVAS EAR/PLS OXIMETRY MLT: CPT

## 2021-09-01 RX ADMIN — PANTOPRAZOLE SODIUM 40 MG: 40 TABLET, DELAYED RELEASE ORAL at 05:55

## 2021-09-01 RX ADMIN — REMDESIVIR 100 MG: 100 INJECTION, POWDER, LYOPHILIZED, FOR SOLUTION INTRAVENOUS at 04:54

## 2021-09-01 ASSESSMENT — PAIN SCALES - GENERAL
PAINLEVEL_OUTOF10: 0

## 2021-09-01 NOTE — PROGRESS NOTES
Pt witnessed decision to leave AMA by this nurse and charge RN. PT PIV removed. Pt walked to ED entrance for discharge.

## 2021-09-01 NOTE — DISCHARGE SUMMARY
09/01/2021    HCT 40.5 09/01/2021    MCV 90.9 09/01/2021     09/01/2021     09/01/2021    K 5.0 09/01/2021    CL 98 09/01/2021    CO2 27 09/01/2021    BUN 25 09/01/2021    CREATININE 1.1 09/01/2021    CALCIUM 8.6 09/01/2021    ALKPHOS 60 09/01/2021    ALT 24 09/01/2021    AST 32 09/01/2021    BILITOT 0.5 09/01/2021    BILIDIR 0.3 08/29/2021    LABALBU 3.4 09/01/2021     Lab Results   Component Value Date    INR 1.03 08/31/2021       Radiology:  XR CHEST PORTABLE    Result Date: 8/29/2021  AP CHEST X-RAY AT 2148: REASON FOR EXAM: Hypoxia COMPARISON: None FINDINGS: Portable AP view of the chest demonstrates a normal cardiomediastinal silhouette. Airspace disease in the periphery of both lungs, most prominently in the periphery of the right upper lobe and lateral left midlung. No pleural fluid or pneumothorax. Bilateral airspace disease, peripherally distributed potentially secondary to viral pneumonia with supporting clinical history. The patient was seen and examined on day of discharge and this discharge summary is in conjunction with any daily progress note from day of discharge. Time Spent on discharge is more than 30 minutes in the examination, evaluation, counseling and review of medications and discharge plan. Signed:    Gilson Bautista DO   9/1/2021      Thank you Sera Lloyd for the opportunity to be involved in this patient's care. If you have any questions or concerns please feel free to contact me at [facility].

## 2021-09-01 NOTE — PROGRESS NOTES
Pt refusing nursing care at this time. Pt refusing further assessment as well as medications. MD has been made aware as well as RT.  Awaiting response from MD.

## 2021-09-01 NOTE — PLAN OF CARE
Problem: Gas Exchange - Impaired  Goal: Absence of hypoxia  Outcome: Ongoing   Patient oxygen dropped into the 80s throughout the night, oxygen increased up to 8L NC high flow. Able to decrease to 7L high flow. Problem:  Body Temperature -  Risk of, Imbalanced  Goal: Ability to maintain a body temperature within defined limits  Outcome: Ongoing   Remains afebrile throughout the night  Problem: Isolation Precautions - Risk of Spread of Infection  Goal: Prevent transmission of infection  Outcome: Ongoing   Continues on droplet plus isolation due to covid virus

## 2021-09-02 ENCOUNTER — CARE COORDINATION (OUTPATIENT)
Dept: CASE MANAGEMENT | Age: 66
End: 2021-09-02

## 2021-09-02 NOTE — CARE COORDINATION
[facility] Transitions Initial Follow Up Call    Call within 2 business days of discharge: Yes    Patient:  Roque Ziegler Patient :  1955  MRN:  [id number]   Reason for Admission:  covid 19   Discharge Date:  21  RARS: 11    CTC attempt to reach Pt regarding recent hospital discharge. CTC unable to leave voice recording with call back number requesting a call back / no answer. Follow up appointments:    No future appointments. KATE Rodriguez, RN  Care Transition Coordinator  Contact Number:  [phone]

## 2021-09-03 ENCOUNTER — CARE COORDINATION (OUTPATIENT)
Dept: CASE MANAGEMENT | Age: 66
End: 2021-09-03

## 2021-09-03 LAB
BLOOD CULTURE, ROUTINE: NORMAL
CULTURE, BLOOD 2: NORMAL

## 2021-09-03 NOTE — CARE COORDINATION
[facility] Transitions Initial Follow Up Call    Call within 2 business days of discharge: Yes    Patient:  Filiberto Ernst  Patient :  1955  MRN:  [id number]    Reason for Admission:  covid 19   Discharge Date:  21   RARS: 11      CTC attempt to reach Pt regarding recent hospital discharge. CTC unable to leave voice recording with call back number requesting a call back / no voicemail setup. Follow up appointments:    No future appointments. AKTE Santos, RN  Care Transition Coordinator  Contact Number:  [phone]